# Patient Record
(demographics unavailable — no encounter records)

---

## 2025-01-23 NOTE — HISTORY OF PRESENT ILLNESS
[FreeTextEntry1] : INTERVAL HX 1/23/2025 Marilee is an 7 y/o boy seen today for a follow-up visit for ADHD combined type. He was taking Focalin 10 mg ER, but it caused mood changes. Medication was switched to guanfacine 1 g ER. Guanfacine was working well until 2 months ago, he is hyperactive, impulsive, making noises. Phone calls returned in school, and it seems like he is not taking anything at all. Parents would like to consider an increase in guanfacine.   ______________________________________________________ INTERVAL HX 9/20/2024 Marilee is a 6 y/o boy seen today for a follow-up visit for ADHD combined type. Since last visit he has been taking Focalin 10 mg and parents report that he has had behavioral issues at home. He is doing well in school and teachers are saying that he is able to focus and is doing well in school. By time he gets homie medication has worn off and behavioral issues are worst then they were before. He is also not eating much throughout the day. Mom reports that he also easts very little snacks. During the summer when he wasn't on the Focalin his appetite was back to baseline and he was eating 3 meals a day. Pt. reports not feeling hungry when he is on the medication. Mom would like to consider other medication options due to decreased appetite.  __________________________________________________________  INTERVAL HX 4/18/2024 Marilee is a 5 y/o boy seen today for a follow-up visit for ADHD combined type. Since last visit he was started on Focalin 5 mg, and it was increased to 10 mg. Mom reports that he is doing well on that does. His personality hasn't changed, and teachers are saying that he is calmer, and he was improved in reading and writing. Mom states that his appetite was slightly decreased but he is eating breakfast and dinner. She reports no other side effects from the medication.  _______________________________________________________________  INTERVAL HX 12/7/2023 Marilee is a 5 y/o boy seen today for a follow-up visit for inattention and behavioral concerns. Since last visit, mom reports the same behavior is still present. During parent teacher conference, teacher reports giving him less work because he is unable to complete classwork. [place] forms submitted by parent and teacher for a diagnosis of ADHD.  __________________________________________________________________________ PREVIOUS VISIT 11/30/2023 Marilee is a 5 y/o boy seen today for an initial visit for inattention and behavioral concerns. At home, he is unable to focus, has behavioral concerns, is unable to follow instructions, and has tantrums and mom says she's noticed it for a while but this year it is affecting him academically. Unable to follow multi-step instructions because he is forgetful. Unable to sit through a meal without getting up unless he has his Ipad. During homework he rushes through it and is very silly, making unnecessary mistakes.   In school, teachers are saying that he struggles to stay in task, and needs redirection. Teachers state that he benefits from preferential seating, positive reinforcement and modified assignments. He is disruptive because he makes random impulsive noise in class.Teachers want him to be evaluated so they can officially offer him accommodations.   Pt stated that he thinks about 100 things a time and that's why it's hard for him to focus.  MARILEE is a 6 year  year old male here for initial evaluation of inattention.   Early development: MARILEE was born full term via NVD. 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 He hit all early developmental milestones appropriately.  MARILEE attended day care program starting at 3 years old and then pre-school at age 4.     Educational assessment: Current Grade: 1st grade  Current District: [employer] [facility] [place]  General ED/Any Accommodations/ICT in place: In a regular classroom setting; 18 students and 2 teacher. Gets OT and group counseling.    Hyperactivity: He is fidgety.   Impulsivity: Blurts out answers or interrupts when others are speaking, has a hard time waiting   Social Concerns: No social concerns, has friends; but lacks social cues and invades personal space.  Sleep: sleeps well 730pm- 6am Eating: eats a varied diet Play: Karate, requires constant redirecting    Family hx of developmental delays/ADD/ADHD: Maternal uncles, aunt and Father.  Other coexisting behaviors: Denies.  -Mood disorder/depression: Maternal Aunt  -Anxiety: Mother, father, maternal Aunt      Co- morbidities: No concern for anxiety, depression, OCD   Other health concerns: Denies twitching, seizure or seizure-like activity. No serious head injury, meningoencephalitis. Concerns for staring episodes.

## 2025-01-23 NOTE — DATA REVIEWED
[No studies available for review at this time.] : No studies available for review at this time. [FreeTextEntry1] :   Parents responses:  Inattention 9/9- (6/9).    Hyperactivity 9/9 (6/9)  ODD: 5/8. (4/8)  Conduct disorder: 1/14 (3/14)  Anxiety/ Depression: 4/7 (3/7)    Teachers responses:   Inattention 9/9- (6/9).    Hyperactivity 9/9 (6/9)  ODD/ Conduct: 0/10. (3/10)  Anxiety/ Depression: 1/7 (3/7)    Performance questions: Parents: Areas of concern include overall school performance, reading, writing,  Teachers: Areas of concerns include written expression. Following directions, assignment completion and organizational skills.  EEG__________________________________________________    EEG Recording Identification:   Type: Awake  Active Problems Attention deficit hyperactivity disorder (ADHD) evaluation (V79.8) (Z13.39) Closed fracture of right ankle, initial encounter (824.8) (S82.891A) Fidgeting (799.29) (R45.89) Hyperactivity (behavior) (314.9) (F90.9) Inattention (799.51) (R41.840) Staring episodes (780.02) (R40.4)      Results/Data       Recording Technique This is a 21-channel EEG recording done in the awake state. A digital recording along with continuous video recording was obtained placing electrodes utilizing the International 10-20 System of electrode placement. A single channel EKG was also recorded. Standard montages were used for review.   Background The background activity during wakefulness was well organized. It was comprised of symmetric mixture of frequencies appropriate for the patient's age. There was a well-modulated 10 Hz posterior dominant rhythm of     Slowing No focal or generalized slowing was noted.   Interictal Activity/Events None.   Attenuation & Asymmetry None.   Activation Procedures Intermittent photic stimulation in incremental frequencies up to 30 Hz did not produce any abnormal activation of epileptiform activity. Hyperventilation for 3 minutes produced generalized slowing.   EKG No clear abnormalities were noted.   Video No clinical events noted during this study.   Impression This is a normal EEG in the awake state.   Clinical Correlation A normal interictal EEG does not exclude nor support the diagnosis of epilepsy.    Assessment Staring episodes (780.02) (R40.4)

## 2025-01-23 NOTE — QUALITY MEASURES
[Impairment in more than one setting] : Impairment in more than one setting: Yes [Coexisting conditions] : Coexisting conditions: Yes [Medication choices] : Medication choices: Yes [Side effects of medications] : Side effects of medications: Yes [FreeTextEntry1] : [place] forms reviewed- ADHD combined type.

## 2025-01-23 NOTE — ASSESSMENT
[FreeTextEntry1] : Anthony is an 7 y/o boy seen today for a follow-up visit for ADHD combined type. currently on Guanfacine 1 mg ER and seems like it stopped working. Neuro exam non focal. Will increase guanfacine to 2 mg ER.

## 2025-01-23 NOTE — PHYSICAL EXAM
[Well-appearing] : well-appearing [Normocephalic] : normocephalic [No dysmorphic facial features] : no dysmorphic facial features [No ocular abnormalities] : no ocular abnormalities [No deformities] : no deformities [Alert] : alert [Well related, good eye contact] : well related, good eye contact [Conversant] : conversant [Normal speech and language] : normal speech and language [Follows instructions well] : follows instructions well [Pupils reactive to light and accommodation] : pupils reactive to light and accommodation [Full extraocular movements] : full extraocular movements [No nystagmus] : no nystagmus [Normal facial sensation to light touch] : normal facial sensation to light touch [No facial asymmetry or weakness] : no facial asymmetry or weakness [Gross hearing intact] : gross hearing intact [Equal palate elevation] : equal palate elevation [Good shoulder shrug] : good shoulder shrug [Normal tongue movement] : normal tongue movement [Midline tongue, no fasciculations] : midline tongue, no fasciculations [Normal axial and appendicular muscle tone] : normal axial and appendicular muscle tone [Gets up on table without difficulty] : gets up on table without difficulty [No pronator drift] : no pronator drift [No abnormal involuntary movements] : no abnormal involuntary movements [5/5 strength in proximal and distal muscles of arms and legs] : 5/5 strength in proximal and distal muscles of arms and legs [Able to walk on toes] : able to walk on toes [2+ biceps] : 2+ biceps [No dysmetria on FTNT] : no dysmetria on FTNT [Good walking balance] : good walking balance [Normal gait] : normal gait [Able to tandem well] : able to tandem well [Negative Romberg] : negative Romberg [de-identified] : Difficulty walking on heels, Dad said he will resume PT

## 2025-01-23 NOTE — PLAN
[FreeTextEntry1] : CURRENT PLAN 01/23/2025 - Start Guanfacine 2mg ER  - Follow-up in 3 months  ________________________________________  PREVIOUS PLAN 9/20/2024 - d/c Focalin 10 mg  - Start guanfacine 1mg ER at night  - Follow-up in 1 month __________________________________________  PREVIOUS PLAN 4/18/2024 - Continue with Focalin 10mg  - Follow-up in 3 months sooner if any changes.  ______________________________________________ PREVIOUS PLAN 12/7/2023 -[place] forms reviewed- ADHD combined type -REEG for staring episodes- Normal  -  trial using of Omega 3 fish oil - Discussed use of medications as well as side effects  -Start Focalin 5 mg PO in the morning.  -Message to  for therapy for oppositional defiant behavior  - Follow up in 1 month to review s/s of ADHD with medication  _________________________________________________________ - [place] questionnaires given to mother for parent and teacher -REEG for staring episodes  -  Discussed use of Omega 3 fish oil - Discussed use of medications as well as side effects if accommodations do not improve school performance - Follow up in 2 weeks to review [place] questionnaires

## 2025-01-23 NOTE — BIRTH HISTORY
[At Term] : at term [place] [Normal Vaginal Route] : by normal vaginal route [None] : there were no delivery complications [Age Appropriate] : age appropriate developmental milestones met [Occupational Therapy] : occupational therapy [FreeTextEntry4] : was in the NICU for RDS [FreeTextEntry5] : Counseling

## 2025-01-23 NOTE — CONSULT LETTER
[Dear  ___] : Dear  [unfilled], [Courtesy Letter:] : I had the pleasure of seeing your patient, [unfilled], in my office today. [Please see my note below.] : Please see my note below. [Consult Closing:] : Thank you very much for allowing me to participate in the care of this patient.  If you have any questions, please do not hesitate to contact me. [Sincerely,] : Sincerely, [FreeTextEntry3] : Eric Madrid NP-BC Certified Family Nurse Practitioner Pediatric Neurology [facility]

## 2025-06-09 NOTE — PLAN
[FreeTextEntry1] : CURRENT PLAN 06/09/2025 - Continue with guanfacine 2 mg ER  - Follow-up in 3 months  ______________________________________  PREVIOUS PLAN 01/23/2025 - Start Guanfacine 2mg ER  - Follow-up in 3 months  ________________________________________  PREVIOUS PLAN 9/20/2024 - d/c Focalin 10 mg  - Start guanfacine 1mg ER at night  - Follow-up in 1 month __________________________________________  PREVIOUS PLAN 4/18/2024 - Continue with Focalin 10mg  - Follow-up in 3 months sooner if any changes.  ______________________________________________ PREVIOUS PLAN 12/7/2023 -[place] forms reviewed- ADHD combined type -REEG for staring episodes- Normal  -  trial using of Omega 3 fish oil - Discussed use of medications as well as side effects  -Start Focalin 5 mg PO in the morning.  -Message to  for therapy for oppositional defiant behavior  - Follow up in 1 month to review s/s of ADHD with medication  _________________________________________________________ - [place] questionnaires given to mother for parent and teacher -REEG for staring episodes  -  Discussed use of Omega 3 fish oil - Discussed use of medications as well as side effects if accommodations do not improve school performance - Follow up in 2 weeks to review [place] questionnaires

## 2025-06-09 NOTE — CONSULT LETTER
[Dear  ___] : Dear  [unfilled], [Courtesy Letter:] : I had the pleasure of seeing your patient, [unfilled], in my office today. [Please see my note below.] : Please see my note below. [Consult Closing:] : Thank you very much for allowing me to participate in the care of this patient.  If you have any questions, please do not hesitate to contact me. [Sincerely,] : Sincerely, [FreeTextEntry3] : Eric Madrid NP-BC Certified Family Nurse Practitioner Pediatric Neurology [facility]

## 2025-06-09 NOTE — QUALITY MEASURES
[Impairment in more than one setting] : Impairment in more than one setting: Yes [Coexisting conditions] : Coexisting conditions: Yes [Medication choices] : Medication choices: Yes [Side effects of medications] : Side effects of medications: Yes [FreeTextEntry1] : [place] forms reviewed- ADHD combined type.

## 2025-06-09 NOTE — REASON FOR VISIT
[Follow-Up Evaluation] : a follow-up evaluation for [ADHD] : ADHD [Patient] : patient [Mother] : mother [Home] : at home, [unfilled] , at the time of the visit. [Medical Office: ([facility])___] : at the medical office located in  [Telehealth (audio & video)] : This visit was provided via telehealth using real-time 2-way audio visual technology. [FreeTextEntry3] : Mother

## 2025-06-09 NOTE — PHYSICAL EXAM
[Well-appearing] : well-appearing [Normocephalic] : normocephalic [No dysmorphic facial features] : no dysmorphic facial features [No ocular abnormalities] : no ocular abnormalities [No deformities] : no deformities [Alert] : alert [Well related, good eye contact] : well related, good eye contact [Conversant] : conversant [Follows instructions well] : follows instructions well [Normal speech and language] : normal speech and language [de-identified] : Difficulty walking on heels, Dad said he will resume PT

## 2025-06-09 NOTE — ASSESSMENT
[FreeTextEntry1] : Anthony is an 9 y/o boy seen today for a follow-up visit for ADHD combined type. currently on Guanfacine 2mg ER and mom reports that it is working well with no side effects. Will continue with Guanfacine 2 mg ER.  Never smoker

## 2025-06-09 NOTE — HISTORY OF PRESENT ILLNESS
[FreeTextEntry1] : INTERVAL HX 06/09/2025 Marilee is an 7 y/o boy seen today for a follow-up visit for ADHD combined type. He is currently on guanfacine 2 mg ER at night and mom states that the medication is working well. He is able to focus and has some control over his impulsivity. Mom reports that she would like to continue with current guanfacine 2mg ER.  __________________________________________________ INTERVAL HX 1/23/2025 Marilee is an 7 y/o boy seen today for a follow-up visit for ADHD combined type. He was taking Focalin 10 mg ER, but it caused mood changes. Medication was switched to guanfacine 1 g ER. Guanfacine was working well until 2 months ago, he is hyperactive, impulsive, making noises. Phone calls returned in school, and it seems like he is not taking anything at all. Parents would like to consider an increase in guanfacine.   ______________________________________________________ INTERVAL HX 9/20/2024 Marilee is a 6 y/o boy seen today for a follow-up visit for ADHD combined type. Since last visit he has been taking Focalin 10 mg and parents report that he has had behavioral issues at home. He is doing well in school and teachers are saying that he is able to focus and is doing well in school. By time he gets homie medication has worn off and behavioral issues are worst then they were before. He is also not eating much throughout the day. Mom reports that he also easts very little snacks. During the summer when he wasn't on the Focalin his appetite was back to baseline and he was eating 3 meals a day. Pt. reports not feeling hungry when he is on the medication. Mom would like to consider other medication options due to decreased appetite.  __________________________________________________________  INTERVAL HX 4/18/2024 Marilee is a 5 y/o boy seen today for a follow-up visit for ADHD combined type. Since last visit he was started on Focalin 5 mg, and it was increased to 10 mg. Mom reports that he is doing well on that does. His personality hasn't changed, and teachers are saying that he is calmer, and he was improved in reading and writing. Mom states that his appetite was slightly decreased but he is eating breakfast and dinner. She reports no other side effects from the medication.  _______________________________________________________________  INTERVAL HX 12/7/2023 Marilee is a 5 y/o boy seen today for a follow-up visit for inattention and behavioral concerns. Since last visit, mom reports the same behavior is still present. During parent teacher conference, teacher reports giving him less work because he is unable to complete classwork. [place] forms submitted by parent and teacher for a diagnosis of ADHD.  __________________________________________________________________________ PREVIOUS VISIT 11/30/2023 Marilee is a 5 y/o boy seen today for an initial visit for inattention and behavioral concerns. At home, he is unable to focus, has behavioral concerns, is unable to follow instructions, and has tantrums and mom says she's noticed it for a while but this year it is affecting him academically. Unable to follow multi-step instructions because he is forgetful. Unable to sit through a meal without getting up unless he has his Ipad. During homework he rushes through it and is very silly, making unnecessary mistakes.   In school, teachers are saying that he struggles to stay in task, and needs redirection. Teachers state that he benefits from preferential seating, positive reinforcement and modified assignments. He is disruptive because he makes random impulsive noise in class.Teachers want him to be evaluated so they can officially offer him accommodations.   Pt stated that he thinks about 100 things a time and that's why it's hard for him to focus.  MARILEE is a 6 year  year old male here for initial evaluation of inattention.   Early development: MARILEE was born full term via NVD. 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 He hit all early developmental milestones appropriately.  MARILEE attended day care program starting at 3 years old and then pre-school at age 4.     Educational assessment: Current Grade: 1st grade  Current District: [employer] [facility] [place] [facility]  General ED/Any Accommodations/ICT in place: In a regular classroom setting; 18 students and 2 teacher. Gets OT and group counseling.    Hyperactivity: He is fidgety.   Impulsivity: Blurts out answers or interrupts when others are speaking, has a hard time waiting   Social Concerns: No social concerns, has friends; but lacks social cues and invades personal space.  Sleep: sleeps well 730pm- 6am Eating: eats a varied diet Play: Karate, requires constant redirecting    Family hx of developmental delays/ADD/ADHD: Maternal uncles, aunt and Father.  Other coexisting behaviors: Denies.  -Mood disorder/depression: Maternal Aunt  -Anxiety: Mother, father, maternal Aunt      Co- morbidities: No concern for anxiety, depression, OCD   Other health concerns: Denies twitching, seizure or seizure-like activity. No serious head injury, meningoencephalitis. Concerns for staring episodes.